# Patient Record
Sex: FEMALE | Race: WHITE | NOT HISPANIC OR LATINO | Employment: UNEMPLOYED | URBAN - METROPOLITAN AREA
[De-identification: names, ages, dates, MRNs, and addresses within clinical notes are randomized per-mention and may not be internally consistent; named-entity substitution may affect disease eponyms.]

---

## 2017-11-21 ENCOUNTER — HOSPITAL ENCOUNTER (EMERGENCY)
Facility: HOSPITAL | Age: 52
Discharge: HOME | End: 2017-11-21
Attending: EMERGENCY MEDICINE

## 2017-11-21 ENCOUNTER — APPOINTMENT (EMERGENCY)
Dept: RADIOLOGY | Facility: HOSPITAL | Age: 52
End: 2017-11-21

## 2017-11-21 VITALS
OXYGEN SATURATION: 98 % | RESPIRATION RATE: 16 BRPM | DIASTOLIC BLOOD PRESSURE: 75 MMHG | WEIGHT: 100 LBS | TEMPERATURE: 99 F | SYSTOLIC BLOOD PRESSURE: 129 MMHG | HEART RATE: 87 BPM

## 2017-11-21 DIAGNOSIS — R42 DIZZINESS: ICD-10-CM

## 2017-11-21 DIAGNOSIS — S62.101A RIGHT WRIST FRACTURE: Primary | ICD-10-CM
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
## 2017-11-21 LAB
ANION GAP SERPL CALCULATED.3IONS-SCNC: 6 MMOL/L (ref 4–13)
BASOPHILS # BLD AUTO: 0.05 THOUSANDS/ΜL (ref 0–0.1)
BASOPHILS NFR BLD AUTO: 0 % (ref 0–1)
BUN SERPL-MCNC: 11 MG/DL (ref 5–25)
CALCIUM SERPL-MCNC: 9 MG/DL (ref 8.3–10.1)
CHLORIDE SERPL-SCNC: 106 MMOL/L (ref 100–108)
CO2 SERPL-SCNC: 28 MMOL/L (ref 21–32)
CREAT SERPL-MCNC: 0.6 MG/DL (ref 0.6–1.3)
EOSINOPHIL # BLD AUTO: 0.16 THOUSAND/ΜL (ref 0–0.61)
EOSINOPHIL NFR BLD AUTO: 1 % (ref 0–6)
ERYTHROCYTE [DISTWIDTH] IN BLOOD BY AUTOMATED COUNT: 15.4 % (ref 11.6–15.1)
GFR SERPL CREATININE-BSD FRML MDRD: 105 ML/MIN/1.73SQ M
GLUCOSE SERPL-MCNC: 85 MG/DL (ref 65–140)
HCT VFR BLD AUTO: 36.6 % (ref 34.8–46.1)
HGB BLD-MCNC: 11.9 G/DL (ref 11.5–15.4)
LYMPHOCYTES # BLD AUTO: 1.85 THOUSANDS/ΜL (ref 0.6–4.47)
LYMPHOCYTES NFR BLD AUTO: 13 % (ref 14–44)
MAGNESIUM SERPL-MCNC: 2.2 MG/DL (ref 1.6–2.6)
MCH RBC QN AUTO: 21.6 PG (ref 26.8–34.3)
MCHC RBC AUTO-ENTMCNC: 32.5 G/DL (ref 31.4–37.4)
MCV RBC AUTO: 66 FL (ref 82–98)
MONOCYTES # BLD AUTO: 0.69 THOUSAND/ΜL (ref 0.17–1.22)
MONOCYTES NFR BLD AUTO: 5 % (ref 4–12)
NEUTROPHILS # BLD AUTO: 11.26 THOUSANDS/ΜL (ref 1.85–7.62)
NEUTS SEG NFR BLD AUTO: 81 % (ref 43–75)
NRBC BLD AUTO-RTO: 0 /100 WBCS
PLATELET # BLD AUTO: 266 THOUSANDS/UL (ref 149–390)
PMV BLD AUTO: 9.5 FL (ref 8.9–12.7)
POTASSIUM SERPL-SCNC: 4.5 MMOL/L (ref 3.5–5.3)
RBC # BLD AUTO: 5.51 MILLION/UL (ref 3.81–5.12)
SODIUM SERPL-SCNC: 140 MMOL/L (ref 136–145)
TSH SERPL DL<=0.05 MIU/L-ACNC: 2.73 UIU/ML (ref 0.36–3.74)
WBC # BLD AUTO: 14.03 THOUSAND/UL (ref 4.31–10.16)

## 2017-11-21 PROCEDURE — 73110 X-RAY EXAM OF WRIST: CPT

## 2017-11-21 PROCEDURE — 70450 CT HEAD/BRAIN W/O DYE: CPT

## 2017-11-21 PROCEDURE — 85025 COMPLETE CBC W/AUTO DIFF WBC: CPT | Performed by: EMERGENCY MEDICINE

## 2017-11-21 PROCEDURE — 84443 ASSAY THYROID STIM HORMONE: CPT | Performed by: EMERGENCY MEDICINE

## 2017-11-21 PROCEDURE — 93005 ELECTROCARDIOGRAM TRACING: CPT | Performed by: EMERGENCY MEDICINE

## 2017-11-21 PROCEDURE — 83735 ASSAY OF MAGNESIUM: CPT | Performed by: EMERGENCY MEDICINE

## 2017-11-21 PROCEDURE — 80048 BASIC METABOLIC PNL TOTAL CA: CPT | Performed by: EMERGENCY MEDICINE

## 2017-11-21 PROCEDURE — 36415 COLL VENOUS BLD VENIPUNCTURE: CPT | Performed by: EMERGENCY MEDICINE

## 2017-11-21 PROCEDURE — 99284 EMERGENCY DEPT VISIT MOD MDM: CPT

## 2017-11-21 RX ORDER — ACETAMINOPHEN 325 MG/1
650 TABLET ORAL ONCE
Status: COMPLETED | OUTPATIENT
Start: 2017-11-21 | End: 2017-11-21

## 2017-11-21 RX ORDER — IBUPROFEN 600 MG/1
600 TABLET ORAL ONCE
Status: COMPLETED | OUTPATIENT
Start: 2017-11-21 | End: 2017-11-21

## 2017-11-21 RX ORDER — IBUPROFEN 600 MG/1
600 TABLET ORAL EVERY 6 HOURS PRN
Qty: 30 TABLET | Refills: 0 | Status: SHIPPED | OUTPATIENT
Start: 2017-11-21

## 2017-11-21 RX ADMIN — IBUPROFEN 600 MG: 600 TABLET, FILM COATED ORAL at 17:36

## 2017-11-21 RX ADMIN — ACETAMINOPHEN 650 MG: 325 TABLET, FILM COATED ORAL at 19:28

## 2017-11-21 NOTE — ED ATTENDING ATTESTATION
Mk Robert DO, saw and evaluated the patient  I have discussed the patient with the resident/non-physician practitioner and agree with the resident's/non-physician practitioner's findings, Plan of Care, and MDM as documented in the resident's/non-physician practitioner's note, except where noted  All available labs and Radiology studies were reviewed  At this point I agree with the current assessment done in the Emergency Department  I have conducted an independent evaluation of this patient including a focused history and a physical exam     ED Note - Axel Mack 46 y o  female MRN: [id number]  Unit/Bed#: ED 21 Encounter: [id number]    History of Present Illness   HPI  Axel Mack is a 46 y o  female who presents for evaluation of right wrist pain after suffering a fall likely related to transient vertigo  Pt  Has Hx of vertigo s/p brain mass resection  Pt  landed on outstretched right arm  No other injuries  No headache or focal neuro deficits  No other complaints  REVIEW OF SYSTEMS  See HPI for further details  12 systems reviewed and otherwise negative except as noted  Historical Information     PAST MEDICAL HISTORY  Past Medical History:   Diagnosis Date    Disease of thyroid gland        FAMILY HISTORY  History reviewed  No pertinent family history  SOCIAL HISTORY  Social History     Social History    Marital status: Unknown     Spouse name: N/A    Number of children: N/A    Years of education: N/A     Social History Main Topics    Smoking status: Current Every Day Smoker     Packs/day: 0 50     Types: Cigarettes    Smokeless tobacco: None    Alcohol use No    Drug use: No    Sexual activity: Not Asked     Other Topics Concern    None     Social History Narrative    None       SURGICAL HISTORY  History reviewed  No pertinent surgical history  Meds/Allergies     CURRENT MEDICATIONS  No current facility-administered medications for this encounter     No current outpatient prescriptions on file  (Not in a hospital admission)    ALLERGIES  No Known Allergies  Objective     PHYSICAL EXAM    VITAL SIGNS: Blood pressure 168/74, pulse 76, temperature 99 °F (37 2 °C), resp  rate 16, weight 45 4 kg (100 lb), SpO2 99 %  Constitutional:  Well developed, well nourished, no acute distress, non-toxic appearance   Eyes:  PERRL, EOMI, conjunctivae pink, sclerae non-icteric   HENT:  Normocephalic/Atraumatic, no rhinorrhea, mucous membranes moist, Tympanic Membranes clear   Neck: normal range of motion, no tenderness, supple   Respiratory:  No respiratory distress, normal breath sounds  Cardiovascular:  Normal rate, normal rhythm   GI:  Soft, non-tender, non-distended, no organomegaly, no mass, no rebound, no guarding   :  No CVAT, no flank ecchymosis   Musculoskeletal:  RIGHT arm: Mild swelling, +ve tenderness over distal forearm/ radius, no deformities  Integument:  Pink, warm, dry, Well hydrated, no rash, no erythema, no bullae   Lymphatic:  No cervical/ tonsillar/ submandibular lymphadenopathy noted   Neurologic:  Awake, Alert & oriented x 3, CN 2-12 intact, no focal neurological deficits, motor function intact  Psychiatric:  Speech and behavior appropriate       ED COURSE and MDM:    Assessment/Plan   Assessment:  45 yo female presents for evaluation of right wrist pain after suffering a fall likely related to transient vertigo  Pt  Has Hx of vertigo s/p brain mass resection  Pt  landed on outstretched right arm  No other injuries  No headache or focal neuro deficits  No other complaints  Plan:  Labs, CT head, symptom management, disposition as appropriate  Portions of the record may have been created with voice recognition software  Occasional wrong word or "sound a like" substitutions may have occurred due to the inherent limitations of voice recognition software       ED Provider  Electronically Signed by

## 2017-11-21 NOTE — ED NOTES
Pt states that she felt discomfort almost dizziness which made her fall and hurt her right wrist  Dr Maynard Later at the bedside      Emmy Cochran RN  11/21/17 2013

## 2017-11-21 NOTE — ED PROVIDER NOTES
History  Chief Complaint   Patient presents with    Wrist Pain     [place] and landed on right wrist, pt denies any other complaints other than right wrist swelling and pain  This is a 51-year-old female with a history of hyperthyroidism who presents from the [facility] after a fall and right wrist injury  The patient speaks Mandgary Hatch of translation line was used  The patient states that at approximately noon today, she had an episode of dizziness described as room spinning which only lasted approximately a few seconds during which she tripped over her foot and fell onto her right wrist   Denies striking her head or losing consciousness  The dizziness resolved spontaneously and she felt fine throughout the day  She only presents to the emergency department because her right wrist pain became unbearable  Prior to her fall, no chest pain, shortness of breath, stroke-like symptoms, numbness/weakness, change in vision, back pain, arm pain  States that she does experience these episodes of dizziness frequently since her surgery to remove a tumor in 2011 in [place]  She follows with a neurologist yearly in [place]  She cannot remember the neurologist's name  Currently, only complains of right wrist pain and chronic right shoulder pain  Denies fever/chills, nausea/vomiting, lightheadedness, numbness/weakness, headache, change in vision, URI symptoms, neck pain, chest pain, palpitations, shortness of breath, cough, back pain, flank pain, abdominal pain, diarrhea, hematochezia, melena, dysuria, hematuria, abnormal vaginal discharge/bleeding  Of note, while speaking, the patient has a right facial twitch and possible right facial droop  When asked, the patient states that this is normal for her  Denies alcohol or drug use today  None       Past Medical History:   Diagnosis Date    Disease of thyroid gland        History reviewed  No pertinent surgical history      History reviewed  No pertinent family history  I have reviewed and agree with the history as documented  Social History   Substance Use Topics    Smoking status: Current Every Day Smoker     Packs/day: 0 50     Types: Cigarettes    Smokeless tobacco: Not on file    Alcohol use No        Review of Systems   Constitutional: Negative for chills and fever  HENT: Negative for rhinorrhea, sore throat and trouble swallowing  Eyes: Negative for photophobia and visual disturbance  Respiratory: Negative for cough, chest tightness and shortness of breath  Cardiovascular: Negative for chest pain, palpitations and leg swelling  Gastrointestinal: Negative for abdominal pain, blood in stool, diarrhea, nausea and vomiting  Endocrine: Negative for polyuria  Genitourinary: Negative for dysuria, flank pain, hematuria, vaginal bleeding and vaginal discharge  Musculoskeletal: Positive for arthralgias  Negative for back pain and neck pain  Skin: Negative for color change and rash  Allergic/Immunologic: Negative for immunocompromised state  Neurological: Positive for dizziness  Negative for weakness, light-headedness, numbness and headaches  All other systems reviewed and are negative  Physical Exam  ED Triage Vitals   Temperature Pulse Respirations Blood Pressure SpO2   11/21/17 1628 11/21/17 1635 11/21/17 1635 11/21/17 1635 11/21/17 1635   99 °F (37 2 °C) 83 20 (!) 188/89 100 %      Temp src Heart Rate Source Patient Position - Orthostatic VS BP Location FiO2 (%)   -- 11/21/17 1700 11/21/17 1635 11/21/17 1635 --    Monitor Lying Right arm       Pain Score       11/21/17 1636       Worst Possible Pain           Orthostatic Vital Signs  Vitals:    11/21/17 1635 11/21/17 1700 11/21/17 1939   BP: (!) 188/89 168/74 129/75   Pulse: 83 76 87   Patient Position - Orthostatic VS: Lying Lying        Physical Exam   Constitutional: Vital signs are normal  She appears well-developed and well-nourished   She is cooperative  Non-toxic appearance  She does not appear ill  HENT:   Head: Normocephalic and atraumatic  Right Ear: Hearing, tympanic membrane, external ear and ear canal normal  No hemotympanum  Left Ear: Hearing, tympanic membrane, external ear and ear canal normal  No hemotympanum  Nose: Nose normal    Mouth/Throat: Uvula is midline, oropharynx is clear and moist and mucous membranes are normal  No tonsillar exudate  Eyes: Conjunctivae, EOM and lids are normal  Pupils are equal, round, and reactive to light  Neck: Trachea normal, normal range of motion and full passive range of motion without pain  Neck supple  No spinous process tenderness present  Cardiovascular: Normal rate, regular rhythm and normal pulses  Pulses:       Radial pulses are 2+ on the right side, and 2+ on the left side  Dorsalis pedis pulses are 2+ on the right side, and 2+ on the left side  Pulmonary/Chest: Effort normal and breath sounds normal  She exhibits no tenderness, no bony tenderness and no crepitus  Abdominal: Soft  Normal appearance and bowel sounds are normal  There is no tenderness  There is no rigidity, no rebound, no guarding, no CVA tenderness, no tenderness at McBurney's point and negative Grossman's sign  Musculoskeletal:   No C-spine, T-spine, L-spine tenderness  Right wrist is swollen  No obvious deformity  The pain with passive and active range of motion  She does have full range of motion  He tender along wrist   No tenderness in proximal ulna, radius  No tenderness in hand  Ain, radial, median, ulnar nerve motor function intact  Radial, median, ulnar nerve sensation intact  Radial pulses 2+  No bony tenderness throughout but otherwise noted  No other evidence of trauma  Patient able to range all joints without pain  Pelvis is stable and nontender  Negative LOS/FADIR  Neurological: She is alert  She has normal strength and normal reflexes  A cranial nerve deficit is present   No sensory deficit  She displays a negative Romberg sign  Coordination and gait normal  GCS eye subscore is 4  GCS verbal subscore is 5  GCS motor subscore is 6  Reflex Scores:       Bicep reflexes are 2+ on the right side and 2+ on the left side  Patellar reflexes are 2+ on the right side and 2+ on the left side  R facial droop and R facial twitch (normal according to patient)  Cranial nerves 2-12 otherwise intact, strength 5/5 throughout, sensation intact throughout  Normal finger-to-nose and heel-to-shin  Normal rapid alternating movements  Negative Romberg  Normal gait  DTRs are normal and symmetric  Psychiatric: She has a normal mood and affect  Her speech is normal and behavior is normal  Thought content normal        ED Medications  Medications   ibuprofen (MOTRIN) tablet 600 mg (600 mg Oral Given 11/21/17 1736)   acetaminophen (TYLENOL) tablet 650 mg (650 mg Oral Given 11/21/17 1928)       Diagnostic Studies  Results Reviewed     Procedure Component Value Units Date/Time    Basic metabolic panel [[id number]] Collected:  11/21/17 1736    Lab Status:  Final result Specimen:  Blood from Arm, Left Updated:  11/21/17 1821     Sodium 140 mmol/L      Potassium 4 5 mmol/L      Chloride 106 mmol/L      CO2 28 mmol/L      Anion Gap 6 mmol/L      BUN 11 mg/dL      Creatinine 0 60 mg/dL      Glucose 85 mg/dL      Calcium 9 0 mg/dL      eGFR 105 ml/min/1 73sq m     Narrative:         National Kidney Disease Education Program recommendations are as follows:  GFR calculation is accurate only with a steady state creatinine  Chronic Kidney disease less than 60 ml/min/1 73 sq  meters  Kidney failure less than 15 ml/min/1 73 sq  meters      Magnesium [[id number]]  (Normal) Collected:  11/21/17 1736    Lab Status:  Final result Specimen:  Blood from Arm, Left Updated:  11/21/17 1821     Magnesium 2 2 mg/dL     TSH, 3rd generation with T4 reflex [[id number]]  (Normal) Collected:  11/21/17 1736    Lab Status:  Final result Specimen:  Blood from Arm, Left Updated:  11/21/17 1819     TSH 3RD GENERATON 2 730 uIU/mL     Narrative:         Patients undergoing fluorescein dye angiography may retain small amounts of fluorescein in the body for 48-72 hours post procedure  Samples containing fluorescein can produce falsely depressed TSH values  If the patient had this procedure,a specimen should be resubmitted post fluorescein clearance  The recommended reference ranges for TSH during pregnancy are as follows:  First trimester 0 1 to 2 5 uIU/mL  Second trimester  0 2 to 3 0 uIU/mL  Third trimester 0 3 to 3 0 uIU/m      CBC and differential [[id number]]  (Abnormal) Collected:  11/21/17 1736    Lab Status:  Final result Specimen:  Blood from Arm, Left Updated:  11/21/17 1751     WBC 14 03 (H) Thousand/uL      RBC 5 51 (H) Million/uL      Hemoglobin 11 9 g/dL      Hematocrit 36 6 %      MCV 66 (L) fL      MCH 21 6 (L) pg      MCHC 32 5 g/dL      RDW 15 4 (H) %      MPV 9 5 fL      Platelets 069 Thousands/uL      nRBC 0 /100 WBCs      Neutrophils Relative 81 (H) %      Lymphocytes Relative 13 (L) %      Monocytes Relative 5 %      Eosinophils Relative 1 %      Basophils Relative 0 %      Neutrophils Absolute 11 26 (H) Thousands/µL      Lymphocytes Absolute 1 85 Thousands/µL      Monocytes Absolute 0 69 Thousand/µL      Eosinophils Absolute 0 16 Thousand/µL      Basophils Absolute 0 05 Thousands/µL                  XR wrist 3+ views RIGHT   ED Interpretation by Piyush Thompson MD (11/21 8228)   Radial fracture      CT head without contrast   Final Result by Grace Almonte MD (11/21 7708)      No acute intracranial abnormality  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 keeping with the patient's history of prior mass resection           Workstation performed: [id number]               Procedures  ECG 12 Lead Documentation  Date/Time: 11/21/2017 5:27 PM  Performed by: Camelia Santamaria  Authorized by: Dagoberto GASTELUM     ECG reviewed by me, the ED Provider: yes    Patient location:  ED  Previous ECG:     Previous ECG:  Unavailable  Interpretation:     Interpretation: abnormal    Rate:     ECG rate:  74    ECG rate assessment: normal    Rhythm:     Rhythm: sinus rhythm    Ectopy:     Ectopy: none    QRS:     QRS axis:  Right    QRS intervals:  Normal  Conduction:     Conduction: normal    ST segments:     ST segments:  Normal  T waves:     T waves: normal      Static Splint Application  Date/Time: 11/21/2017 7:54 PM  Performed by: Tamie De Leon  Authorized by: Basilio Forde     Patient location:  ED  Procedure performed by emergency physician: Yes    Other Assisting Provider: No    Consent:     Consent obtained:  Verbal    Consent given by:  Patient    Risks discussed:  Discoloration, numbness, pain and swelling    Alternatives discussed:  No treatment  Universal protocol:     Procedure explained and questions answered to patient or proxy's satisfaction: yes      Relevant documents present and verified: yes      Test results available and properly labeled: yes      Imaging studies available: yes      Patient identity confirmed:  Verbally with patient and arm band  Indication:     Indications: fracture    Pre-procedure details:     Sensation:  Normal    Skin color:  Pink  Procedure details:     Laterality:  Right    Location:  Wrist    Wrist:  R wrist    Strapping: no      Splint type:  Volar short arm    Supplies:  Ortho-Glass, cotton padding and elastic bandage  Post-procedure details:     Pain:  Unchanged    Sensation:  Normal    Neurovascular Exam: skin pink      Patient tolerance of procedure:   Tolerated well, no immediate complications          Phone Consults  ED Phone Contact    ED Course  ED Course as of Nov 21 2023   Tue Nov 21, 2017   1758 No evidence of infection WBC: (!) 14 03   1758 Hemoglobin: 11 9   1758 Platelets: 440   9928 Magnesium: 2 2   1830 TSH 3RD GENERATON: 2 730   1830 Sodium: 140   1830 Potassium: 4 5   1830 BUN: 11   1830 Creatinine: 0 60   1830 Glucose: 85   1830 eGFR: 105                               [facility]  Number of Diagnoses or Management Options  Diagnosis management comments: I will check a CT of her head secondary to previous Neurosurgery and dizziness  I will check an x-ray of her right wrist   I will check a CBC, BMP, magnesium  I will check an EKG  I will treat her pain with Advil for now  CritCare Time    Disposition  Final diagnoses:   Right wrist fracture   Fall, initial encounter   Dizziness     Time reflects when diagnosis was documented in both MDM as applicable and the Disposition within this note     Time User Action Codes Description Comment    11/21/2017  7:20 PM Lukas Israel Add [S62 101A] Right wrist fracture     11/21/2017  7:20 PM Sharon Doretha P Add [D62  ORJL] Fall, initial encounter     11/21/2017  7:20 PM Sharon Doretha P Add [R42] Dizziness       ED Disposition     ED Disposition Condition Comment    Discharge  Pepper Blandon discharge to home/self care  Condition at discharge: Stable        Follow-up Information     Follow up With Specialties Details Why Contact Info Additional [address] Emergency Department Emergency Medicine Go to If symptoms worsen [address]  [phone]  ED, [address] [place], [place], [address] Teresa Monroe MD Orthopedic Surgery Call in 1 day for follow up [address] [place], [place] [address] [address] [place] [address] [address] [facility]  [phone]       Orthopedic Surgery  Call in 1 day  Follow up with orthopedic surgery in [place]  Patient's Medications   Discharge Prescriptions    IBUPROFEN (MOTRIN) 600 MG TABLET    Take 1 tablet by mouth every 6 (six) hours as needed for mild pain       Start Date: 11/21/2017End Date: --       Order Dose: 600 mg       Quantity: 30 tablet    Refills: 0     No discharge procedures on file      ED Provider  Attending physically available and evaluated Thomas Ventura I managed the patient along with the ED Attending      Electronically Signed by         Margart Galeazzi, MD  Resident  11/21/17 8100

## 2017-11-22 LAB
ATRIAL RATE: 74 BPM
P AXIS: 59 DEGREES
PR INTERVAL: 128 MS
QRS AXIS: 92 DEGREES
QRSD INTERVAL: 88 MS
QT INTERVAL: 408 MS
QTC INTERVAL: 452 MS
T WAVE AXIS: 21 DEGREES
VENTRICULAR RATE: 74 BPM

## 2017-11-22 NOTE — DISCHARGE INSTRUCTIONS
Arm Fracture in Adults   WHAT YOU NEED TO KNOW:   An arm fracture is a crack or break in one or more of the bones in your arm  An arm fracture may be caused by a fall onto an outstretched hand  It may also be caused by trauma from a car accident or a sports injury  Osteoporosis (brittle bones) can increase your risk for a fracture  DISCHARGE INSTRUCTIONS:   Return to the emergency department if:   · The pain in your injured arm does not get better or gets worse, even after you rest and take medicine  · Your injured arm, hand, or fingers feel numb  · Your arm is swollen, red, and feels warm  · Your skin over the arm fracture is swollen, cold, or pale  · You cannot move your arm, hand, or fingers  Contact your healthcare provider if:   · You have a fever  · Your brace or splint becomes wet, damaged, or comes off  · You have questions or concerns about your injury, treatment, or care  Medicines:   · NSAIDs , such as ibuprofen, help decrease swelling and pain  This medicine is available with or without a doctor's order  NSAIDs can cause stomach bleeding or kidney problems in certain people  If you take blood thinner medicine, always ask your healthcare provider if NSAIDs are safe for you  Always read the medicine label and follow directions  · Acetaminophen  decreases pain  It is available without a doctor's order  Ask how much to take and how often to take it  Follow directions  Acetaminophen can cause liver damage if not taken correctly  · Prescription pain medicine  may be given  Ask how to take this medicine safely  · Take your medicine as directed  Contact your healthcare provider if you think your medicine is not helping or if you have side effects  Tell him or her if you are allergic to any medicine  Keep a list of the medicines, vitamins, and herbs you take  Include the amounts, and when and why you take them  Bring the list or the pill bottles to follow-up visits  Carry your medicine list with you in case of an emergency  Follow up with your healthcare provider within 1 week: You may need to see a bone specialist within 3 to 4 days if you need surgery or further treatment for your arm fracture  Write down your questions so you remember to ask them during your visits  Rest:  You should rest your arm as much as possible  Ask your healthcare provider when you can put pressure or weight on your arm  Also ask when you can return to sports or vigorous exercises  Ice:  Apply ice on your arm for 15 to 20 minutes every hour or as directed  Use an ice pack, or put crushed ice in a plastic bag  Cover it with a towel  Ice helps prevent tissue damage and decreases swelling and pain  Elevate:  Elevate your arm above the level of your heart as often as you can  This will help decrease swelling and pain  Prop your arm on pillows or blankets to keep it elevated comfortably  Care for your cast or splint:  Ask your healthcare provider when it is okay to bathe  Do not get your cast or splint wet  Before you take a bath or shower, cover your cast or splint with a plastic bag  Tape the bag to your skin to help keep water out  Hold your arm away from the water in case the bag leaks  · Check the skin around your cast or splint each day for any redness or open skin  · Do not use a sharp or pointed object to scratch your skin under the cast or splint  Physical therapy:  A physical therapist teaches you exercises to help improve movement and strength, and to decrease pain  © 2017 [employer] Information is for End User's use only and may not be sold, redistributed or otherwise used for commercial purposes  All illustrations and images included in CareNotes® are the copyrighted property of [employer]  or Haroldo Freeman  The above information is an  only  It is not intended as medical advice for individual conditions or treatments   Talk to your doctor, nurse or pharmacist before following any medical regimen to see if it is safe and effective for you  Dizziness   WHAT YOU NEED TO KNOW:   Dizziness is a feeling of being off balance or unsteady  Common causes of dizziness are an inner ear fluid imbalance or a lack of oxygen in your blood  Dizziness may be acute (lasts 3 days or less) or chronic (lasts longer than 3 days)  You may have dizzy spells that last from seconds to a few hours  DISCHARGE INSTRUCTIONS:   Return to the emergency department if:   · You have a headache and a stiff neck  · You have shaking chills and a fever  · You vomit over and over with no relief  · Your vomit or bowel movements are red or black  · You have pain in your chest, back, or abdomen  · You have numbness, especially in your face, arms, or legs  · You have trouble moving your arms or legs  · You are confused  Contact your healthcare provider if:   · You have a fever  · Your symptoms do not get better with treatment  · You have questions or concerns about your condition or care  Manage your symptoms:   · Do not drive  or operate heavy machinery when you are dizzy  · Get up slowly  from sitting or lying down  · Drink plenty of liquids  Liquids help prevent dehydration  Ask how much liquid to drink each day and which liquids are best for you  Follow up with your healthcare provider as directed:  Write down your questions so you remember to ask them during your visits  © 2017 [address] [employer] Information is for End User's use only and may not be sold, redistributed or otherwise used for commercial purposes  All illustrations and images included in CareNotes® are the copyrighted property of [employer]  or [employer]  The above information is an  only  It is not intended as medical advice for individual conditions or treatments   Talk to your doctor, nurse or pharmacist before following any medical regimen to see if it is safe and effective for you  Splint Care   WHAT YOU NEED TO KNOW:   Splint care is important to help protect your splint until it comes off  Some splints are made of fiberglass or plaster that will need to dry and harden  Splint care will help the splint dry and harden correctly  Even after your splint hardens, it can be damaged  DISCHARGE INSTRUCTIONS:   Return to the emergency department if:   · You have increased pain  · Your fingers or toes are numb or tingling  · You feel burning or stinging around your injury  · Your nails, fingers, or toes turn pale, blue, or gray, and feel cold  · You have new or increased trouble moving your fingers or toes  · Your swelling gets worse  · The skin under your splint is bleeding or leaking pus  Contact your healthcare provider if:   · Your hard splint gets wet or is damaged  · You have a fever  · Your splint feels tighter  · You have itchy, dry skin under your splint that is getting worse  · The skin under your splint is red, or you have a new sore  · You notice a bad smell coming from your splint  · You have questions or concerns about your condition or care  How to care for your splint:   · Wait for your hard splint to harden completely  You may have to wait up to 3 days before you can walk on a plaster splint  · Check your splint and the skin around it each day  Check your splint for damage, such as cracks and breaks  Check your skin for redness, increased swelling, and sores  Loosen the elastic bandage around your splint if it feels too tight  · Keep your splint clean and dry  Keep dirt out of your splint  Before you bathe, wrap your hard splint with 2 layers of plastic  Then put a plastic bag over it  Keep the plastic bag tightly sealed  You can also ask your healthcare provider about waterproof shields  Do not put your hard splint in the water , even with a plastic bag over it   A wet splint can make your skin itchy, and may lead to infection  · Do not put powders or deodorants inside your splint  These can dry your skin and increase itching  · Do not try to scratch the skin inside your hard splint with sharp objects  Sharp objects can break off inside your splint or hurt your skin  · Do not pull the padding out of your splint  The padding inside your splint protects your skin  You may develop a sore on your skin if you take out the padding  Follow up with your healthcare provider as directed within 1 to 2 weeks:  Write down your questions so you remember to ask them during your visits  © 2017 [facility] [facility] Information is for End User's use only and may not be sold, redistributed or otherwise used for commercial purposes  All illustrations and images included in CareNotes® are the copyrighted property of [employer]  or [employer] Freeman  The above information is an  only  It is not intended as medical advice for individual conditions or treatments  Talk to your doctor, nurse or pharmacist before following any medical regimen to see if it is safe and effective for you